# Patient Record
Sex: MALE | Race: OTHER | HISPANIC OR LATINO
[De-identification: names, ages, dates, MRNs, and addresses within clinical notes are randomized per-mention and may not be internally consistent; named-entity substitution may affect disease eponyms.]

---

## 2018-01-23 ENCOUNTER — NON-APPOINTMENT (OUTPATIENT)
Age: 63
End: 2018-01-23

## 2018-01-23 ENCOUNTER — APPOINTMENT (OUTPATIENT)
Dept: CARDIOLOGY | Facility: CLINIC | Age: 63
End: 2018-01-23
Payer: COMMERCIAL

## 2018-01-23 VITALS
OXYGEN SATURATION: 96 % | SYSTOLIC BLOOD PRESSURE: 126 MMHG | HEART RATE: 82 BPM | WEIGHT: 180 LBS | DIASTOLIC BLOOD PRESSURE: 79 MMHG

## 2018-01-23 DIAGNOSIS — I10 ESSENTIAL (PRIMARY) HYPERTENSION: ICD-10-CM

## 2018-01-23 PROCEDURE — 93000 ELECTROCARDIOGRAM COMPLETE: CPT

## 2018-01-23 PROCEDURE — 99203 OFFICE O/P NEW LOW 30 MIN: CPT | Mod: 25

## 2018-01-23 RX ADMIN — LOSARTAN POTASSIUM AND HYDROCHLOROTHIAZIDE 0 MG: 50; 12.5 TABLET, FILM COATED ORAL at 00:00

## 2018-01-28 RX ORDER — LOSARTAN POTASSIUM AND HYDROCHLOROTHIAZIDE 12.5; 5 MG/1; MG/1
50-12.5 TABLET ORAL DAILY
Qty: 30 | Refills: 3 | Status: ACTIVE | COMMUNITY
Start: 2018-01-24 | End: 1900-01-01

## 2018-02-01 ENCOUNTER — APPOINTMENT (OUTPATIENT)
Dept: CARDIOLOGY | Facility: CLINIC | Age: 63
End: 2018-02-01

## 2018-04-11 ENCOUNTER — APPOINTMENT (OUTPATIENT)
Dept: CARDIOLOGY | Facility: CLINIC | Age: 63
End: 2018-04-11

## 2020-02-27 ENCOUNTER — EMERGENCY (EMERGENCY)
Facility: HOSPITAL | Age: 65
LOS: 1 days | Discharge: HOME | End: 2020-02-27
Attending: EMERGENCY MEDICINE
Payer: SELF-PAY

## 2020-02-27 VITALS
HEART RATE: 71 BPM | WEIGHT: 145.06 LBS | RESPIRATION RATE: 18 BRPM | SYSTOLIC BLOOD PRESSURE: 142 MMHG | TEMPERATURE: 98 F | OXYGEN SATURATION: 99 % | DIASTOLIC BLOOD PRESSURE: 82 MMHG

## 2020-02-27 PROCEDURE — 94640 AIRWAY INHALATION TREATMENT: CPT

## 2020-02-27 PROCEDURE — T1013: CPT

## 2020-02-27 PROCEDURE — 99284 EMERGENCY DEPT VISIT MOD MDM: CPT

## 2020-02-27 PROCEDURE — 99283 EMERGENCY DEPT VISIT LOW MDM: CPT | Mod: 25

## 2020-02-27 PROCEDURE — 71046 X-RAY EXAM CHEST 2 VIEWS: CPT

## 2020-02-27 PROCEDURE — 71046 X-RAY EXAM CHEST 2 VIEWS: CPT | Mod: 26

## 2020-02-27 RX ORDER — LOSARTAN/HYDROCHLOROTHIAZIDE 100MG-25MG
1 TABLET ORAL
Qty: 14 | Refills: 0
Start: 2020-02-27 | End: 2020-03-11

## 2020-02-27 RX ORDER — IPRATROPIUM/ALBUTEROL SULFATE 18-103MCG
3 AEROSOL WITH ADAPTER (GRAM) INHALATION ONCE
Refills: 0 | Status: COMPLETED | OUTPATIENT
Start: 2020-02-27 | End: 2020-02-27

## 2020-02-27 RX ADMIN — Medication 3 MILLILITER(S): at 21:31

## 2020-02-27 NOTE — ED PROVIDER NOTE - ATTENDING CONTRIBUTION TO CARE
adult male, NAD, well appearing with cough for several weeks that is apparently closely related to time frame in which he was switched from losartan to lisinopril; recommend d/c ACEi; otherwise unremarkable exam, ok for f/u with pCP

## 2020-02-27 NOTE — ED PROVIDER NOTE - CLINICAL SUMMARY MEDICAL DECISION MAKING FREE TEXT BOX
65 yo male PMh of HTn on med presents in [place] and cough for past month . states mostly at the night times and with cold  with clear phlegm    neb and CXR reeval , cough sec to 65 yo male PMh of HTn on med presents in [place] and cough for past month . states mostly at the night times and with cold  with clear phlegm    neb and CXR reeval , cough sec to lisinopril

## 2020-02-27 NOTE — ED ADULT TRIAGE NOTE - CHIEF COMPLAINT QUOTE
Patient is alert and oriented x4 c/o non productive cough xfew months. denies chest pain or sob. denies n/v/d. denies otger flu like symptoms.

## 2020-02-27 NOTE — ED PROVIDER NOTE - OBJECTIVE STATEMENT
63 yo male PMh of HTn on med presents in [place] and cough for past month . states mostly at the night times and with cold  with clear phlegm and he had  F.u With primary care prescribed him Tessalon and taking the Mucinex dm that dose not helping . states at the same time in PCP office he received the flu / pNA vaccine and states he received the he denies any fever , chills, cough blood , chest pain SOb , palliation, or leg swollen , denies any hx of heart diseases ,   no other compliant  Song pfeiffer 63 yo male PMh of HTn on med presents in [place] and cough for past month . states mostly at the night times and with cold  with clear phlegm and he had  F.u With primary care prescribed him Tessalon and taking the Mucinex dm that dose not helping . states at the same time in PCP office he received the flu / pNA vaccine and states he received the he denies any fever , chills, cough blood , chest pain SOb , palliation, or leg swollen , denies any hx of heart diseases ,   no other compliant  bp med lis/ [id number]/12.5  Song pfeiffer

## 2020-02-27 NOTE — ED PROVIDER NOTE - PATIENT PORTAL LINK FT
You can access the FollowMyHealth Patient Portal offered by [facility] by registering at the following website: [email or web address]. By joining [employer]’s FollowMyHealth portal, you will also be able to view your health information using other applications (apps) compatible with our system.

## 2020-02-27 NOTE — ED PROVIDER NOTE - NSFOLLOWUPINSTRUCTIONS_ED_ALL_ED_FT
Cough    Coughing is a reflex that clears your throat and your airways. Coughing helps to heal and protect your lungs. It is normal to cough occasionally, but a cough that happens with other symptoms or lasts a long time may be a sign of a condition that needs treatment. Coughing may be caused by infections, asthma or COPD, smoking, postnasal drip, gastroesophageal reflux, as well as other medical conditions. Take medicines only as instructed by your health care provider. Avoid environments or triggers that causes you to cough at work or at home.    SEEK IMMEDIATE MEDICAL CARE IF YOU HAVE ANY OF THE FOLLOWING SYMPTOMS: coughing up blood, shortness of breath, rapid heart rate, chest pain, unexplained weight loss or night sweats. Tos    La tos es un reflejo que aclara la garganta y las vías respiratorias. Toser ayuda a sanar y proteger inderjit pulmones. Es normal toser ocasionalmente, ankit abraham tos que ocurre con otros síntomas o que dura mucho tiempo puede ser un signo de abraham afección que necesita tratamiento. La tos puede ser causada por infecciones, asma o EPOC, fumar, goteo posnasal, reflujo gastroesofágico y otras afecciones médicas. [place] los medicamentos solo según las instrucciones de rowell proveedor de atención médica. Evite entornos o factores desencadenantes que le provoquen tos en el trabajo o en el hogar.    BUSQUE ATENCIÓN MÉDICA INMEDIATA SI TIENE ALGUNO DE LOS SÍNTOMAS SIGUIENTES: tos con fartun, dificultad para respirar, frecuencia cardíaca rápida, dolor en el pecho, pérdida de peso inexplicable o sudores nocturnos.    deje de omar rowell medicamento para la presión arterial diaria y, en rowell lugar, tome el medicamento blod pressuer según lo recetado, continúe tomando rowell medicamento para la tos según lo prescrito por la atención primaria, llame y manny un seguimiento con la atención primaria para abraham evaluación adicional

## 2020-02-27 NOTE — ED PROVIDER NOTE - ENMT, MLM
Airway patent, Nasal mucosa clear. PND noted on post pharynx , Mouth with normal mucosa. Throat has no vesicles, no oropharyngeal exudates and uvula is midline.

## 2021-10-06 PROBLEM — I10 ESSENTIAL HYPERTENSION: Status: ACTIVE | Noted: 2018-01-23

## 2024-01-18 NOTE — ED PROVIDER NOTE - NSCAREINITIATED _GEN_ER
drift  Motor Arm, Right (5b): No drift  Motor Leg, Left (6a): No drift  Motor Leg, Right (6b): No drift  Limb Ataxia (7): Absent  Sensory (8): Normal  Best Language (9): No aphasia  Dysarthria (10): Normal  Extinction and Inattention (11): No abnormality  Total: 1    [place] Coma Scale  Eye Opening: Spontaneous  Best Verbal Response: Confused  Best Motor Response: Obeys commands  [place] Coma Scale Score: 14                CIWA Assessment  BP: (!) 145/98  Pulse: 75           PHYSICAL EXAM  1 or more Elements     ED Triage Vitals [01/18/24 1108]   BP Temp Temp Source Pulse Respirations SpO2 Height Weight   (!) 173/95 97.7 °F (36.5 °C) Oral 74 16 100 % -- --       General: No acute distress. Alert and Oriented. Appears stated age.  HEENT: No difficulty tolerating oral secretions.   Cardiac: Regular rate and rhythm. Radial pulses are intact bilaterally.   Chest: No respiratory distress. No increased work of breathing. No use of accessory muscles for respiration.   Abdomen: Soft, nontender, nondistended, non-peritonitic.   Extremities:No significant lower extremity edema. Lower extremities are symmetric.   Neuro: Neuro:  CN I not assessed. Pupils are equal, round and reactive to light bilaterally. Extraocular motions are full and intact bilaterally. Facial sensation to light touch in the trigeminal distribution is intact bilaterally. Patient able to raise bilateral eyebrows. Symmetric smile. Tongue protrudes midline. Able to puff out cheeks bilaterally. Shrugs shoulders bilaterally. Speech is clear and fluent. No difficulty with word finding. Truncal ataxia is not present. Strength is 5/5 in the bilateral upper extremities (flexion and extension at elbow, wrist, intact and strong  of the hands). Strength is 5/5 in the bilateral lower extremities (flexion and extension at hip, knee and plantarflexion and dorsiflexion at the ankle), intact sensation to light touch in all four extremities. There is no pronator drift. 
Hakeem Deal(PA)